# Patient Record
Sex: FEMALE | Race: WHITE | Employment: STUDENT | URBAN - METROPOLITAN AREA
[De-identification: names, ages, dates, MRNs, and addresses within clinical notes are randomized per-mention and may not be internally consistent; named-entity substitution may affect disease eponyms.]

---
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
## 2023-02-24 ENCOUNTER — LAB ENCOUNTER (OUTPATIENT)
Dept: LAB | Age: 18
End: 2023-02-24
Attending: DERMATOLOGY
Payer: COMMERCIAL

## 2023-02-24 DIAGNOSIS — L70.0 ACNE VULGARIS: Primary | ICD-10-CM

## 2023-02-24 LAB
ALBUMIN SERPL-MCNC: 4.3 G/DL (ref 3.4–5)
ALBUMIN/GLOB SERPL: 1.2 {RATIO} (ref 1–2)
ALP LIVER SERPL-CCNC: 57 U/L
ALT SERPL-CCNC: 14 U/L
ANION GAP SERPL CALC-SCNC: 4 MMOL/L (ref 0–18)
AST SERPL-CCNC: 18 U/L (ref 15–37)
BASOPHILS # BLD AUTO: 0.02 X10(3) UL (ref 0–0.2)
BASOPHILS NFR BLD AUTO: 0.3 %
BILIRUB SERPL-MCNC: 0.5 MG/DL (ref 0.1–2)
BUN BLD-MCNC: 7 MG/DL (ref 7–18)
BUN/CREAT SERPL: 9.7 (ref 10–20)
CALCIUM BLD-MCNC: 9.6 MG/DL (ref 8.5–10.1)
CHLORIDE SERPL-SCNC: 109 MMOL/L (ref 98–112)
CHOLEST SERPL-MCNC: 124 MG/DL (ref ?–200)
CO2 SERPL-SCNC: 26 MMOL/L (ref 21–32)
CREAT BLD-MCNC: 0.72 MG/DL
DEPRECATED RDW RBC AUTO: 43.4 FL (ref 35.1–46.3)
EOSINOPHIL # BLD AUTO: 0.09 X10(3) UL (ref 0–0.7)
EOSINOPHIL NFR BLD AUTO: 1.5 %
ERYTHROCYTE [DISTWIDTH] IN BLOOD BY AUTOMATED COUNT: 12.7 % (ref 11–15)
FASTING PATIENT LIPID ANSWER: YES
FASTING STATUS PATIENT QL REPORTED: YES
GFR SERPLBLD BASED ON 1.73 SQ M-ARVRAT: 123 ML/MIN/1.73M2 (ref 60–?)
GLOBULIN PLAS-MCNC: 3.7 G/DL (ref 2.8–4.4)
GLUCOSE BLD-MCNC: 101 MG/DL (ref 70–99)
HCT VFR BLD AUTO: 39.9 %
HDLC SERPL-MCNC: 62 MG/DL (ref 40–59)
HGB BLD-MCNC: 13.2 G/DL
IMM GRANULOCYTES # BLD AUTO: 0.01 X10(3) UL (ref 0–1)
IMM GRANULOCYTES NFR BLD: 0.2 %
LDLC SERPL CALC-MCNC: 47 MG/DL (ref ?–100)
LYMPHOCYTES # BLD AUTO: 1.92 X10(3) UL (ref 1.5–5)
LYMPHOCYTES NFR BLD AUTO: 31.7 %
MCH RBC QN AUTO: 30.9 PG (ref 26–34)
MCHC RBC AUTO-ENTMCNC: 33.1 G/DL (ref 31–37)
MCV RBC AUTO: 93.4 FL
MONOCYTES # BLD AUTO: 0.39 X10(3) UL (ref 0.1–1)
MONOCYTES NFR BLD AUTO: 6.4 %
NEUTROPHILS # BLD AUTO: 3.63 X10 (3) UL (ref 1.5–7.7)
NEUTROPHILS # BLD AUTO: 3.63 X10(3) UL (ref 1.5–7.7)
NEUTROPHILS NFR BLD AUTO: 59.9 %
NONHDLC SERPL-MCNC: 62 MG/DL (ref ?–130)
OSMOLALITY SERPL CALC.SUM OF ELEC: 286 MOSM/KG (ref 275–295)
PLATELET # BLD AUTO: 336 10(3)UL (ref 150–450)
POTASSIUM SERPL-SCNC: 4 MMOL/L (ref 3.5–5.1)
PROT SERPL-MCNC: 8 G/DL (ref 6.4–8.2)
RBC # BLD AUTO: 4.27 X10(6)UL
SODIUM SERPL-SCNC: 139 MMOL/L (ref 136–145)
TRIGL SERPL-MCNC: 71 MG/DL (ref 30–149)
VLDLC SERPL CALC-MCNC: 10 MG/DL (ref 0–30)
WBC # BLD AUTO: 6.1 X10(3) UL (ref 4–11)

## 2023-02-24 PROCEDURE — 80053 COMPREHEN METABOLIC PANEL: CPT

## 2023-02-24 PROCEDURE — 36415 COLL VENOUS BLD VENIPUNCTURE: CPT

## 2023-02-24 PROCEDURE — 85025 COMPLETE CBC W/AUTO DIFF WBC: CPT

## 2023-02-24 PROCEDURE — 80061 LIPID PANEL: CPT

## 2023-12-11 ENCOUNTER — LAB ENCOUNTER (OUTPATIENT)
Dept: LAB | Age: 18
End: 2023-12-11
Attending: FAMILY MEDICINE
Payer: COMMERCIAL

## 2023-12-11 ENCOUNTER — OFFICE VISIT (OUTPATIENT)
Dept: FAMILY MEDICINE CLINIC | Facility: CLINIC | Age: 18
End: 2023-12-11
Payer: COMMERCIAL

## 2023-12-11 VITALS
WEIGHT: 145 LBS | HEIGHT: 63 IN | SYSTOLIC BLOOD PRESSURE: 118 MMHG | DIASTOLIC BLOOD PRESSURE: 74 MMHG | BODY MASS INDEX: 25.69 KG/M2

## 2023-12-11 DIAGNOSIS — R10.10 PAIN OF UPPER ABDOMEN: ICD-10-CM

## 2023-12-11 DIAGNOSIS — Z00.00 WELL ADULT EXAM: ICD-10-CM

## 2023-12-11 DIAGNOSIS — Z23 NEED FOR MENINGOCOCCAL VACCINATION: ICD-10-CM

## 2023-12-11 DIAGNOSIS — Z29.89 NEED FOR MALARIA PROPHYLAXIS: ICD-10-CM

## 2023-12-11 DIAGNOSIS — Z00.00 WELL ADULT EXAM: Primary | ICD-10-CM

## 2023-12-11 DIAGNOSIS — Z71.84 COUNSELING FOR TRAVEL: ICD-10-CM

## 2023-12-11 LAB
ALBUMIN SERPL-MCNC: 4.5 G/DL (ref 3.2–4.8)
ALBUMIN/GLOB SERPL: 1.5 {RATIO} (ref 1–2)
ALP LIVER SERPL-CCNC: 59 U/L
ALT SERPL-CCNC: 11 U/L
ANION GAP SERPL CALC-SCNC: 7 MMOL/L (ref 0–18)
AST SERPL-CCNC: 17 U/L (ref ?–34)
BASOPHILS # BLD AUTO: 0.02 X10(3) UL (ref 0–0.2)
BASOPHILS NFR BLD AUTO: 0.3 %
BILIRUB SERPL-MCNC: 0.5 MG/DL (ref 0.3–1.2)
BILIRUB UR QL: NEGATIVE
BUN BLD-MCNC: <5 MG/DL (ref 9–23)
CALCIUM BLD-MCNC: 9.4 MG/DL (ref 8.7–10.4)
CHLORIDE SERPL-SCNC: 105 MMOL/L (ref 98–112)
CO2 SERPL-SCNC: 25 MMOL/L (ref 21–32)
COLOR UR: YELLOW
CREAT BLD-MCNC: 0.66 MG/DL
DEPRECATED RDW RBC AUTO: 42.7 FL (ref 35.1–46.3)
EGFRCR SERPLBLD CKD-EPI 2021: 129 ML/MIN/1.73M2 (ref 60–?)
EOSINOPHIL # BLD AUTO: 0.05 X10(3) UL (ref 0–0.7)
EOSINOPHIL NFR BLD AUTO: 0.8 %
ERYTHROCYTE [DISTWIDTH] IN BLOOD BY AUTOMATED COUNT: 12.5 % (ref 11–15)
FASTING STATUS PATIENT QL REPORTED: NO
GLOBULIN PLAS-MCNC: 3.1 G/DL (ref 2.8–4.4)
GLUCOSE BLD-MCNC: 84 MG/DL (ref 70–99)
GLUCOSE UR-MCNC: NORMAL MG/DL
HCT VFR BLD AUTO: 40.2 %
HGB BLD-MCNC: 13.3 G/DL
IMM GRANULOCYTES # BLD AUTO: 0.02 X10(3) UL (ref 0–1)
IMM GRANULOCYTES NFR BLD: 0.3 %
KETONES UR-MCNC: NEGATIVE MG/DL
LEUKOCYTE ESTERASE UR QL STRIP.AUTO: 250
LYMPHOCYTES # BLD AUTO: 1.61 X10(3) UL (ref 1–4)
LYMPHOCYTES NFR BLD AUTO: 26 %
MCH RBC QN AUTO: 30.4 PG (ref 26–34)
MCHC RBC AUTO-ENTMCNC: 33.1 G/DL (ref 31–37)
MCV RBC AUTO: 92 FL
MONOCYTES # BLD AUTO: 0.36 X10(3) UL (ref 0.1–1)
MONOCYTES NFR BLD AUTO: 5.8 %
NEUTROPHILS # BLD AUTO: 4.13 X10 (3) UL (ref 1.5–7.7)
NEUTROPHILS # BLD AUTO: 4.13 X10(3) UL (ref 1.5–7.7)
NEUTROPHILS NFR BLD AUTO: 66.8 %
NITRITE UR QL STRIP.AUTO: NEGATIVE
PH UR: 5 [PH] (ref 5–8)
PLATELET # BLD AUTO: 351 10(3)UL (ref 150–450)
POTASSIUM SERPL-SCNC: 3.4 MMOL/L (ref 3.5–5.1)
PROT SERPL-MCNC: 7.6 G/DL (ref 5.7–8.2)
PROT UR-MCNC: 20 MG/DL
RBC # BLD AUTO: 4.37 X10(6)UL
RBC #/AREA URNS AUTO: >10 /HPF
SODIUM SERPL-SCNC: 137 MMOL/L (ref 136–145)
SP GR UR STRIP: 1.01 (ref 1–1.03)
TSI SER-ACNC: 3.2 MIU/ML (ref 0.55–4.78)
UROBILINOGEN UR STRIP-ACNC: NORMAL
WBC # BLD AUTO: 6.2 X10(3) UL (ref 4–11)

## 2023-12-11 PROCEDURE — 84443 ASSAY THYROID STIM HORMONE: CPT

## 2023-12-11 PROCEDURE — 81001 URINALYSIS AUTO W/SCOPE: CPT | Performed by: FAMILY MEDICINE

## 2023-12-11 PROCEDURE — 3008F BODY MASS INDEX DOCD: CPT | Performed by: FAMILY MEDICINE

## 2023-12-11 PROCEDURE — 3078F DIAST BP <80 MM HG: CPT | Performed by: FAMILY MEDICINE

## 2023-12-11 PROCEDURE — 85025 COMPLETE CBC W/AUTO DIFF WBC: CPT

## 2023-12-11 PROCEDURE — 36415 COLL VENOUS BLD VENIPUNCTURE: CPT

## 2023-12-11 PROCEDURE — 80053 COMPREHEN METABOLIC PANEL: CPT

## 2023-12-11 PROCEDURE — 90620 MENB-4C VACCINE IM: CPT | Performed by: FAMILY MEDICINE

## 2023-12-11 PROCEDURE — 3074F SYST BP LT 130 MM HG: CPT | Performed by: FAMILY MEDICINE

## 2023-12-11 PROCEDURE — 99395 PREV VISIT EST AGE 18-39: CPT | Performed by: FAMILY MEDICINE

## 2023-12-11 PROCEDURE — 87086 URINE CULTURE/COLONY COUNT: CPT | Performed by: FAMILY MEDICINE

## 2023-12-11 PROCEDURE — 90471 IMMUNIZATION ADMIN: CPT | Performed by: FAMILY MEDICINE

## 2023-12-11 RX ORDER — ATOVAQUONE AND PROGUANIL HYDROCHLORIDE 250; 100 MG/1; MG/1
1 TABLET, FILM COATED ORAL DAILY
Qty: 78 TABLET | Refills: 0 | Status: SHIPPED | OUTPATIENT
Start: 2023-12-11

## 2023-12-11 RX ORDER — AZITHROMYCIN 250 MG/1
TABLET, FILM COATED ORAL
Qty: 6 TABLET | Refills: 0 | Status: SHIPPED | OUTPATIENT
Start: 2023-12-11 | End: 2023-12-15

## 2023-12-11 RX ORDER — CLINDAMYCIN PHOSPHATE 10 MG/G
GEL TOPICAL
COMMUNITY
Start: 2023-12-05

## 2023-12-11 RX ORDER — ISOTRETINOIN 40 MG/1
CAPSULE, LIQUID FILLED ORAL
COMMUNITY
Start: 2023-10-12 | End: 2023-12-11

## 2023-12-11 RX ORDER — SCOLOPAMINE TRANSDERMAL SYSTEM 1 MG/1
1 PATCH, EXTENDED RELEASE TRANSDERMAL
Qty: 10 PATCH | Refills: 0 | Status: SHIPPED | OUTPATIENT
Start: 2023-12-11 | End: 2024-01-10

## 2023-12-11 RX ORDER — ONDANSETRON 4 MG/1
4 TABLET, FILM COATED ORAL EVERY 8 HOURS PRN
Qty: 20 TABLET | Refills: 0 | Status: SHIPPED | OUTPATIENT
Start: 2023-12-11

## (undated) NOTE — Clinical Note
[address] [place] of Child Health Examination       Student's Name  Tommy Vero Yung D (If adding dates to the above immunization history section, put your initials by date(s) and sign here.)   ALTERNATIVE PROOF OF IMMUNITY   1.Clinical diagnosis (measles, mumps, hepatits B) is allowed when verified by physician & supported with lab confirma •  Clindamycin Phos-Benzoyl Perox 1-5 % External Gel, Apply 1 Application topically 2 (two) times daily. , Disp: 1 each, Rfl: 3  •  Melatonin 3 MG Oral Cap, Take 3 mg by mouth as needed. , Disp: , Rfl:    Diagnosis of asthma?   Child wakes during the night co /61 mmHg  Pulse 62  Temp(Src) 98 °F (36.7 °C) (Oral)  Ht 5' 3\" (1.6 m)  Wt 131 lb (59.421 kg)  BMI 23.21 kg/m2    DIABETES SCREENING  BMI>85% age/sex  No And any two of the following:  Family History                    Ethnic Minority Currently Prescribed Asthma Medication:            Quick-relief  medication (e.g. Short Acting Beta Antagonist): No          Controller medication (e.g. inhaled corticosteroid):   No Other   NEEDS/MODIFICATIONS required in the school setting  None DIET

## (undated) NOTE — Clinical Note
VACCINE ADMINISTRATION RECORD  PARENT / GUARDIAN APPROVAL  Date: 2017  Vaccine administered to: Sandrita Beyer     : 2005    MRN: [id number]    A copy of the appropriate Centers for Disease Control and Prevention Vaccine Information statemen

## (undated) NOTE — MR AVS SNAPSHOT
[facility] SPECIALTY [facility] [place] [address] [address]  [id number] [phone]               Thank you for choosing us for your health care visit with Serafin Mejia MD.  We are glad to serve you and happy to provide you with this summary of yo the food you eat or drink—for example, chocolate or soda. Types of acne  The 4 main types of acne blemishes are described below:  · Whiteheads are round, white blemishes that form when hair follicles become clogged.   · Blackheads are round, dark blemishes ? What do you usually drink with meals and between meals? ? How much time do you spend watching TV, on the computer, texting, or playing videogames every day? ? How much physical activity do you get every day? ? Are you concerned about your weight?   Nut o Count time spent doing chores: car washing, walking the dog, mowing the lawn, sweeping, or cleaning your room!  o “Screen time” doesn’t count, so get up and move as much as possible.  o Balance your day: take a walk or ride your bike with a friend, play Life at home. How is your child’s behavior? Does he or she get along with others in the family? Is he or she respectful of you, other adults, and authority? Does your child participate in family events, or does he or she withdraw from other family members? changes, becoming lower and deeper. As the penis grows and matures, erections and “wet dreams” begin to occur. Reassure your son that this is normal.  Emotional changes.  Along with these physical changes, you’ll likely notice changes in your child’s person family time. It also lets you see what and how your child eats. Pay attention to portions. Serve portions that make sense for your kids. Let them stop eating when they’re full—don’t make them clean their plates.  Be aware that many kids’ appetites increase In the car, all children younger than 13 should sit in the back seat. Children shorter than 4'9\" (57 inches) should continue to use a booster seat to properly position the seat belt.   If your child has a cell phone or portable music player, make sure thes Remind your child that you can check the web browser history and cell phone logs to know how these devices are being used. Use parental controls and passwords to block access to inappropriate websites.  Use privacy settings on websites so only your child’s Melatonin 3 MG Caps   Take 3 mg by mouth as needed.                 Where to Get Your Medications      These medications were sent to [address] [place] [address] [id number] - Ashkan Arce, [address] [phone], [address] [facility], [address] o Create a home where healthy choices are available and encouraged  o Make it fun – find ways to engage your children such as:  o playing a game of tag  o cooking healthy meals together  o creating a rainbow shopping list to find colorful fruits and vegeta

## (undated) NOTE — LETTER
[address] [place] [facility] Child Health Examination       Student's Name  Nay Buenrostro Birth D Signature                                                                                                                                              Title                           Date    (If adding dates to the above immunization history section, put y ALLERGIES  (Food, drug, insect, other)  Patient has no known allergies. MEDICATION  (List all prescribed or taken on a regular basis.)    Current Outpatient Prescriptions:   •  Melatonin 3 MG Oral Cap, Take 3 mg by mouth as needed. , Disp: , Rfl:   •  Clind PHYSICAL EXAMINATION REQUIREMENTS (head circumference if <33 years old):   /67   Pulse 61   Temp 98.4 °F (36.9 °C) (Oral)   Ht 5' 3.5\" (1.613 m)   Wt 133 lb (60.3 kg)   LMP 05/16/2018 (Exact Date)   BMI 23.19 kg/m²     DIABETES SCREENING  BMI>85% a Mouth/Dental Yes  Spinal examination Yes    Cardiovascular/HTN Yes  Nutritional status Yes    Respiratory Yes                   Diagnosis of Asthma: No Mental Health Yes        Currently Prescribed Asthma Medication:            Quick-relief  medication (e.

## (undated) NOTE — LETTER
[facility] [employer] of Child Health Examination       Student's Name  Anjali Yung Title                           Date     Signature HEALTH HISTORY          TO BE COMPLETED AND SIGNED BY PARENT/GUARDIAN AND VERIFIED BY HEALTH CARE PROVIDER    ALLERGIES  (Food, drug, insect, other)  Patient has no known allergies.  MEDICATION  (List all prescribed or taken on a regular basis.)  No current /76   Pulse 72   Temp 98.2 °F (36.8 °C) (Temporal)   Ht 5' 3.3\" (1.608 m)   Wt 149 lb (67.6 kg)   LMP 07/27/2020 (Exact Date)   BMI 26.14 kg/m²     DIABETES SCREENING  BMI>85% age/sex  No And any two of the following:  Family History No    Ethnic Mi Respiratory Yes                   Diagnosis of Asthma: No Mental Health Yes        Currently Prescribed Asthma Medication:            Quick-relief  medication (e.g. Short Acting Beta Antagonist): No          Controller medication (e.g. inhaled corticostero

## (undated) NOTE — LETTER
VACCINE ADMINISTRATION RECORD  PARENT / GUARDIAN APPROVAL  Date: 2020  Vaccine administered to: Celeste Pabon     : 2005    MRN: [id number]    A copy of the appropriate Centers for Disease Control and Prevention Vaccine Information statement

## (undated) NOTE — LETTER
Date & Time: 6/3/2021, 3:57 PM  Patient: Jimmy Number  Encounter Provider(s):    DARRON Veronica       To Whom It May Concern:    Joselyn Tucker was seen and treated in our department on 6/3/2021.  She may return to work when fever free for 24

## (undated) NOTE — ED AVS SNAPSHOT
Parent/Legal Guardian Access to the Online C-Note Record of a Patient 15to 16Years Old  Return completed form by Secure email to [facility] Department: [email or web address].     Requirements and Procedures   Under [facility] MyChart ID and password with another person, that person may be able to view my or my child’s health information, and health information about someone who has authorized me as a MyChart proxy.    ·  I agree that it is my responsibility to select a confident Sign-Up Form and I agree to its terms.        Authorization Form     Please enter Patient’s information below:   Name (last, first, middle initial) __________________________________________   Gender  Male  Female    Last 4 Digits of Social Security Number Parent/Legal Guardian Signature                                  For Patient (1517 years of age)  I agree to allow my parent/legal guardian, named above, online access to my medical information currently available and that may become available as a result